# Patient Record
Sex: FEMALE | Race: WHITE | NOT HISPANIC OR LATINO
[De-identification: names, ages, dates, MRNs, and addresses within clinical notes are randomized per-mention and may not be internally consistent; named-entity substitution may affect disease eponyms.]

---

## 2019-07-31 ENCOUNTER — APPOINTMENT (OUTPATIENT)
Dept: OTOLARYNGOLOGY | Facility: CLINIC | Age: 84
End: 2019-07-31
Payer: MEDICARE

## 2019-07-31 VITALS
DIASTOLIC BLOOD PRESSURE: 75 MMHG | OXYGEN SATURATION: 99 % | TEMPERATURE: 98.5 F | RESPIRATION RATE: 15 BRPM | WEIGHT: 121 LBS | HEART RATE: 65 BPM | SYSTOLIC BLOOD PRESSURE: 168 MMHG | HEIGHT: 60 IN | BODY MASS INDEX: 23.75 KG/M2

## 2019-07-31 DIAGNOSIS — H90.41 SENSORINEURAL HEARING LOSS, UNILATERAL, RIGHT EAR, WITH UNRESTRICTED HEARING ON THE CONTRALATERAL SIDE: ICD-10-CM

## 2019-07-31 PROCEDURE — 92550 TYMPANOMETRY & REFLEX THRESH: CPT

## 2019-07-31 PROCEDURE — 99203 OFFICE O/P NEW LOW 30 MIN: CPT

## 2019-07-31 PROCEDURE — 92557 COMPREHENSIVE HEARING TEST: CPT

## 2019-07-31 NOTE — HISTORY OF PRESENT ILLNESS
[de-identified] : 86 yo woman with h/o b snhl and r cwd mastoidectomy has noticed r progressive hl for months - years.no inciting event. no pain or drainage, no uri. denies vertigo. no fh cw cc. she feels hl is moderate and wisehs to see what can be done. has had cwd mastoidectomy many yrs ago.

## 2019-07-31 NOTE — PHYSICAL EXAM
[de-identified] : r cavity clean canal stenotic [Midline] : trachea located in midline position [Normal] : no rashes

## 2022-03-03 ENCOUNTER — NON-APPOINTMENT (OUTPATIENT)
Age: 87
End: 2022-03-03

## 2022-03-03 ENCOUNTER — APPOINTMENT (OUTPATIENT)
Dept: OPHTHALMOLOGY | Facility: CLINIC | Age: 87
End: 2022-03-03
Payer: MEDICARE

## 2022-03-03 PROCEDURE — 92020 GONIOSCOPY: CPT

## 2022-03-03 PROCEDURE — 92004 COMPRE OPH EXAM NEW PT 1/>: CPT

## 2022-04-14 ENCOUNTER — APPOINTMENT (OUTPATIENT)
Dept: OPHTHALMOLOGY | Facility: CLINIC | Age: 87
End: 2022-04-14
Payer: MEDICARE

## 2022-04-14 ENCOUNTER — NON-APPOINTMENT (OUTPATIENT)
Age: 87
End: 2022-04-14

## 2022-04-14 PROCEDURE — 92012 INTRM OPH EXAM EST PATIENT: CPT

## 2022-08-18 ENCOUNTER — APPOINTMENT (OUTPATIENT)
Dept: OPHTHALMOLOGY | Facility: CLINIC | Age: 87
End: 2022-08-18

## 2022-08-18 ENCOUNTER — NON-APPOINTMENT (OUTPATIENT)
Age: 87
End: 2022-08-18

## 2022-08-18 PROCEDURE — 92012 INTRM OPH EXAM EST PATIENT: CPT

## 2022-09-22 ENCOUNTER — APPOINTMENT (OUTPATIENT)
Dept: OTOLARYNGOLOGY | Facility: CLINIC | Age: 87
End: 2022-09-22

## 2022-12-02 ENCOUNTER — NON-APPOINTMENT (OUTPATIENT)
Age: 87
End: 2022-12-02

## 2022-12-02 ENCOUNTER — APPOINTMENT (OUTPATIENT)
Dept: OPHTHALMOLOGY | Facility: CLINIC | Age: 87
End: 2022-12-02

## 2022-12-02 PROCEDURE — 92012 INTRM OPH EXAM EST PATIENT: CPT

## 2023-04-14 ENCOUNTER — NON-APPOINTMENT (OUTPATIENT)
Age: 88
End: 2023-04-14

## 2023-04-14 ENCOUNTER — APPOINTMENT (OUTPATIENT)
Dept: OPHTHALMOLOGY | Facility: CLINIC | Age: 88
End: 2023-04-14
Payer: MEDICARE

## 2023-04-14 PROCEDURE — 92012 INTRM OPH EXAM EST PATIENT: CPT

## 2023-08-11 ENCOUNTER — APPOINTMENT (OUTPATIENT)
Dept: OPHTHALMOLOGY | Facility: CLINIC | Age: 88
End: 2023-08-11
Payer: MEDICARE

## 2023-08-11 ENCOUNTER — NON-APPOINTMENT (OUTPATIENT)
Age: 88
End: 2023-08-11

## 2023-08-11 PROCEDURE — 92012 INTRM OPH EXAM EST PATIENT: CPT

## 2024-01-26 ENCOUNTER — NON-APPOINTMENT (OUTPATIENT)
Age: 89
End: 2024-01-26

## 2024-01-26 ENCOUNTER — APPOINTMENT (OUTPATIENT)
Dept: OPHTHALMOLOGY | Facility: CLINIC | Age: 89
End: 2024-01-26
Payer: MEDICARE

## 2024-01-26 PROCEDURE — 92083 EXTENDED VISUAL FIELD XM: CPT

## 2024-01-26 PROCEDURE — 92133 CPTRZD OPH DX IMG PST SGM ON: CPT

## 2024-01-26 PROCEDURE — 92012 INTRM OPH EXAM EST PATIENT: CPT

## 2024-04-19 ENCOUNTER — NON-APPOINTMENT (OUTPATIENT)
Age: 89
End: 2024-04-19

## 2024-04-19 ENCOUNTER — APPOINTMENT (OUTPATIENT)
Dept: OPHTHALMOLOGY | Facility: CLINIC | Age: 89
End: 2024-04-19
Payer: MEDICARE

## 2024-04-19 PROCEDURE — 92012 INTRM OPH EXAM EST PATIENT: CPT

## 2024-08-15 ENCOUNTER — APPOINTMENT (OUTPATIENT)
Dept: OPHTHALMOLOGY | Facility: CLINIC | Age: 89
End: 2024-08-15
Payer: MEDICARE

## 2024-08-15 ENCOUNTER — NON-APPOINTMENT (OUTPATIENT)
Age: 89
End: 2024-08-15

## 2024-08-15 PROCEDURE — 92012 INTRM OPH EXAM EST PATIENT: CPT

## 2025-02-13 ENCOUNTER — NON-APPOINTMENT (OUTPATIENT)
Age: 89
End: 2025-02-13

## 2025-02-13 ENCOUNTER — APPOINTMENT (OUTPATIENT)
Dept: OPHTHALMOLOGY | Facility: CLINIC | Age: 89
End: 2025-02-13
Payer: MEDICARE

## 2025-02-13 PROCEDURE — 92012 INTRM OPH EXAM EST PATIENT: CPT

## 2025-05-08 ENCOUNTER — NON-APPOINTMENT (OUTPATIENT)
Age: 89
End: 2025-05-08

## 2025-05-08 ENCOUNTER — APPOINTMENT (OUTPATIENT)
Dept: OPHTHALMOLOGY | Facility: CLINIC | Age: 89
End: 2025-05-08
Payer: MEDICARE

## 2025-05-08 PROCEDURE — 92014 COMPRE OPH EXAM EST PT 1/>: CPT

## 2025-05-08 PROCEDURE — 92133 CPTRZD OPH DX IMG PST SGM ON: CPT

## 2025-05-08 PROCEDURE — 92136 OPHTHALMIC BIOMETRY: CPT

## 2025-06-09 ENCOUNTER — APPOINTMENT (OUTPATIENT)
Dept: OPHTHALMOLOGY | Facility: CLINIC | Age: 89
End: 2025-06-09
Payer: MEDICARE

## 2025-06-09 ENCOUNTER — NON-APPOINTMENT (OUTPATIENT)
Age: 89
End: 2025-06-09

## 2025-06-09 PROCEDURE — 92250 FUNDUS PHOTOGRAPHY W/I&R: CPT

## 2025-06-09 PROCEDURE — 92014 COMPRE OPH EXAM EST PT 1/>: CPT

## 2025-07-01 RX ORDER — ATROPINE SULFATE 1 %
1 OINTMENT (GRAM) OPHTHALMIC (EYE)
Refills: 0 | Status: COMPLETED | OUTPATIENT
Start: 2025-07-02 | End: 2025-07-02

## 2025-07-01 RX ORDER — OFLOXACIN 3 MG/ML
1 SOLUTION OPHTHALMIC
Refills: 0 | Status: COMPLETED | OUTPATIENT
Start: 2025-07-02 | End: 2025-07-02

## 2025-07-01 RX ORDER — PHENYLEPHRINE HYDROCHLORIDE 25 MG/ML
1 SOLUTION OPHTHALMIC
Refills: 0 | Status: COMPLETED | OUTPATIENT
Start: 2025-07-02 | End: 2025-07-02

## 2025-07-01 RX ORDER — TROPICAMIDE
1 POWDER (GRAM) MISCELLANEOUS
Refills: 0 | Status: COMPLETED | OUTPATIENT
Start: 2025-07-02 | End: 2025-07-02

## 2025-07-01 NOTE — ASU PATIENT PROFILE, ADULT - NS PREOP UNDERSTANDS INFO
NPO/NO  solid foods after  12Mn,  water till  6 am, dress comfortable, no lotions, no jewelry,  Bring ID photo  and insurance cards,  escort arranged with daughter , address and telephone given/yes

## 2025-07-01 NOTE — ASU PATIENT PROFILE, ADULT - FALL HARM RISK - HARM RISK INTERVENTIONS
Communicate Risk of Fall with Harm to all staff/Reinforce activity limits and safety measures with patient and family/Tailored Fall Risk Interventions/Visual Cue: Yellow wristband and red socks/Bed in lowest position, wheels locked, appropriate side rails in place/Call bell, personal items and telephone in reach/Instruct patient to call for assistance before getting out of bed or chair/Non-slip footwear when patient is out of bed/[place] to call system/Physically safe environment - no spills, clutter or unnecessary equipment/Purposeful Proactive Rounding/Room/bathroom lighting operational, light cord in reach Assistance with ambulation/Assistance OOB with selected safe patient handling equipment/Communicate Risk of Fall with Harm to all staff/Discuss with provider need for PT consult/Monitor gait and stability/Provide patient with walking aids - walker, cane, crutches/Reinforce activity limits and safety measures with patient and family/Tailored Fall Risk Interventions/Visual Cue: Yellow wristband and red socks/Bed in lowest position, wheels locked, appropriate side rails in place/Call bell, personal items and telephone in reach/Instruct patient to call for assistance before getting out of bed or chair/Non-slip footwear when patient is out of bed/[place] to call system/Physically safe environment - no spills, clutter or unnecessary equipment/Purposeful Proactive Rounding/Room/bathroom lighting operational, light cord in reach

## 2025-07-01 NOTE — ASU PATIENT PROFILE, ADULT - MEDICATIONS TO HOLD
Losartan and Hydrochlorothiazide   as per MD  guide lines  asked to call  her MD for advise , patient concerns about this holding

## 2025-07-01 NOTE — ASU PATIENT PROFILE, ADULT - PRESSURE ULCER(S)
Pre-Surgery Instructions:   Medication Instructions    aspirin (ECOTRIN LOW STRENGTH) 81 mg EC tablet Patient was instructed by Physician and understands   atorvastatin (LIPITOR) 80 mg tablet Continue to take this medication on your normal schedule  If this is an oral medication and you take it in the morning, then you may take this medicine with a sip of water  Spoke with patient's daughter, Colten Walters, after obtaining permission from patient  Covid screening negative as per patient's daughter  Reviewed via phone all medication instructions  Advised not to take any NSAID's, Vitamins or Herbal products prior to the DOS  Acetaminophen products are ok to take  Patient's daughter states pt was instructed by cardiologist, Belem Mccracken, to continue taking Aspirin as ordered,do not hold prior to surgery and instructed to call Dr  with any questions  Reviewed showering instructions as given by Surgeon's office  Informed about call from [facility] with time of scheduled surgery  Verbal understanding obtained  ASA Med Class: Aspirin  Should be discontinued at least one week prior to planned operation, unless specifically stated otherwise by surgical service  Your Surgeon may have patient stop taking aspirin up to a week before surgery if having intracranial, middle ear, posterior eye, spine surgery or prostate surgery  [Patients taking aspirin for coronary stents should be reviewed by an anesthesiologist in the optimization clinic  Please do not discontinue aspirin in patients with coronary stents unless given specific permission to do so by the cardiologist who prescribed medication ]   If your surgeon approves please continue to take this medication on your normal schedule  You may take this medication on the morning of your surgery with a sip of water  Statin Med Class  Continue to take this medication on your normal schedule    If this is an oral medication and you take it in the morning, then you may take this medicine with a sip of water  no

## 2025-07-01 NOTE — ASU PATIENT PROFILE, ADULT - NSICDXPASTMEDICALHX_GEN_ALL_CORE_FT
PAST MEDICAL HISTORY:  H/O Crohn's disease     H/O: hypertension      PAST MEDICAL HISTORY:  H/O Crohn's disease     H/O: hypertension     TIA on medication aspirin 325

## 2025-07-01 NOTE — ASU PATIENT PROFILE, ADULT - NSICDXPASTSURGICALHX_GEN_ALL_CORE_FT
PAST SURGICAL HISTORY:  History of Crohn's disease intestinal surgery ayears ago    History of disorder of ear or mastoid right

## 2025-07-02 ENCOUNTER — RESULT REVIEW (OUTPATIENT)
Age: 89
End: 2025-07-02

## 2025-07-02 ENCOUNTER — OUTPATIENT (OUTPATIENT)
Dept: OUTPATIENT SERVICES | Facility: HOSPITAL | Age: 89
LOS: 1 days | Discharge: HOME | End: 2025-07-02
Payer: MEDICARE

## 2025-07-02 ENCOUNTER — NON-APPOINTMENT (OUTPATIENT)
Age: 89
End: 2025-07-02

## 2025-07-02 ENCOUNTER — APPOINTMENT (OUTPATIENT)
Dept: OPHTHALMOLOGY | Facility: AMBULATORY SURGERY CENTER | Age: 89
End: 2025-07-02

## 2025-07-02 VITALS
DIASTOLIC BLOOD PRESSURE: 64 MMHG | RESPIRATION RATE: 16 BRPM | TEMPERATURE: 98 F | SYSTOLIC BLOOD PRESSURE: 157 MMHG | HEIGHT: 60 IN | HEART RATE: 76 BPM | WEIGHT: 119.49 LBS | OXYGEN SATURATION: 97 %

## 2025-07-02 VITALS
RESPIRATION RATE: 16 BRPM | OXYGEN SATURATION: 97 % | HEART RATE: 72 BPM | DIASTOLIC BLOOD PRESSURE: 69 MMHG | TEMPERATURE: 98 F | SYSTOLIC BLOOD PRESSURE: 155 MMHG

## 2025-07-02 DIAGNOSIS — Z87.19 PERSONAL HISTORY OF OTHER DISEASES OF THE DIGESTIVE SYSTEM: Chronic | ICD-10-CM

## 2025-07-02 DIAGNOSIS — Z86.69 PERSONAL HISTORY OF OTHER DISEASES OF THE NERVOUS SYSTEM AND SENSE ORGANS: Chronic | ICD-10-CM

## 2025-07-02 PROCEDURE — 66986 EXCHANGE LENS PROSTHESIS: CPT | Mod: LT

## 2025-07-02 PROCEDURE — 88300 SURGICAL PATH GROSS: CPT | Mod: 26

## 2025-07-02 PROCEDURE — 67036 REMOVAL OF INNER EYE FLUID: CPT | Mod: LT

## 2025-07-02 DEVICE — LASER PROBE 25G CONSTELLATION: Type: IMPLANTABLE DEVICE | Site: LEFT EYE | Status: FUNCTIONAL

## 2025-07-02 DEVICE — IMPLANTABLE DEVICE: Type: IMPLANTABLE DEVICE | Site: LEFT EYE | Status: FUNCTIONAL

## 2025-07-02 RX ORDER — ASPIRIN 325 MG
0 TABLET ORAL
Refills: 0 | DISCHARGE

## 2025-07-02 RX ORDER — ASPIRIN 325 MG
1 TABLET ORAL
Refills: 0 | DISCHARGE

## 2025-07-02 RX ORDER — TETRACAINE HYDROCHLORIDE 5 MG/ML
1 SOLUTION OPHTHALMIC ONCE
Refills: 0 | Status: COMPLETED | OUTPATIENT
Start: 2025-07-02 | End: 2025-07-02

## 2025-07-02 RX ORDER — AMLODIPINE BESYLATE 10 MG/1
1 TABLET ORAL
Refills: 0 | DISCHARGE

## 2025-07-02 RX ORDER — SODIUM CHLORIDE 9 G/1000ML
500 INJECTION, SOLUTION INTRAVENOUS
Refills: 0 | Status: DISCONTINUED | OUTPATIENT
Start: 2025-07-02 | End: 2025-07-02

## 2025-07-02 RX ORDER — ATORVASTATIN CALCIUM 80 MG/1
1 TABLET, FILM COATED ORAL
Refills: 0 | DISCHARGE

## 2025-07-02 RX ORDER — LOSARTAN POTASSIUM 100 MG/1
1 TABLET, FILM COATED ORAL
Refills: 0 | DISCHARGE

## 2025-07-02 RX ORDER — ONDANSETRON HCL/PF 4 MG/2 ML
4 VIAL (ML) INJECTION ONCE
Refills: 0 | Status: DISCONTINUED | OUTPATIENT
Start: 2025-07-02 | End: 2025-07-02

## 2025-07-02 RX ORDER — HYDROCHLOROTHIAZIDE 50 MG/1
1 TABLET ORAL
Refills: 0 | DISCHARGE

## 2025-07-02 RX ORDER — LOSARTAN POTASSIUM AND HYDROCHLOROTHIAZIDE 12.5; 5 MG/1; MG/1
1 TABLET ORAL
Refills: 0 | DISCHARGE

## 2025-07-02 RX ADMIN — Medication 1 DROP(S): at 10:45

## 2025-07-02 RX ADMIN — OFLOXACIN 1 DROP(S): 3 SOLUTION OPHTHALMIC at 10:45

## 2025-07-02 RX ADMIN — PHENYLEPHRINE HYDROCHLORIDE 1 DROP(S): 25 SOLUTION OPHTHALMIC at 10:50

## 2025-07-02 RX ADMIN — TETRACAINE HYDROCHLORIDE 1 DROP(S): 5 SOLUTION OPHTHALMIC at 10:40

## 2025-07-02 RX ADMIN — OFLOXACIN 1 DROP(S): 3 SOLUTION OPHTHALMIC at 10:50

## 2025-07-02 RX ADMIN — PHENYLEPHRINE HYDROCHLORIDE 1 DROP(S): 25 SOLUTION OPHTHALMIC at 10:40

## 2025-07-02 RX ADMIN — Medication 1 DROP(S): at 10:50

## 2025-07-02 RX ADMIN — Medication 1 DROP(S): at 10:40

## 2025-07-02 RX ADMIN — PHENYLEPHRINE HYDROCHLORIDE 1 DROP(S): 25 SOLUTION OPHTHALMIC at 10:45

## 2025-07-02 RX ADMIN — OFLOXACIN 1 DROP(S): 3 SOLUTION OPHTHALMIC at 10:40

## 2025-07-02 NOTE — PRE-ANESTHESIA EVALUATION ADULT - NSANTHOSAYNRD_GEN_A_CORE
No. JASSON screening performed.  STOP BANG Legend: 0-2 = LOW Risk; 3-4 = INTERMEDIATE Risk; 5-8 = HIGH Risk

## 2025-07-02 NOTE — OPERATIVE REPORT - OPERATIVE RPOSRT DETAILS
Dislocated IOL and IOL exchange       PATIENT NAME: CLAIR SHER    ATTENDING: Christine Ha MD    PREOPERATIVE DIAGNOSIS(ES):  Dislocated intraocular lens, left eye    POSTOPERATIVE DIAGNOSIS(ES):  As above    PROCEDURE: Pars plana vitrectomy, IOL exchange with scleral fixated IOL -  all of the left eye     INDICATIONS:       The patient is a 89y year old Female with a history of a dislocated intraocular lens in the operative eye. After a detailed review of the risks, benefits and alternatives of the procedure, including but not limited to bleeding, infection, inflammation, retinal detachment, loss of vision, loss of eye, double vision, unclear visual potential, need for further surgery, and systemic risks of anesthesia and surgery, informed consent was obtained and the patient elected to proceed with the surgery.     PROCEDURE:       On the day of surgery, after clearance by medicine and anesthesia, the patient was brought to the operating room in stable condition. After verifying the correct surgical site, the patient was placed in the supine position. Intravenous sedation was provided by the anesthesia team.  After a brief time-out, a 1:1 mixture of 2% lidocaine and 0.75% Marcaine was injected in a retrobulbar fashion behind the operative eye. The patient was then prepped and draped in the usual sterile fashion.  Eyelashes were draped out of the operative field and a stainless steel eyelid speculum was placed in the operative eye.  A time-out was performed verifying correct patient, procedure, site, positioning and special equipment prior to starting the case.      A 1.0 sideport was used to make 4 paracenteses for the placement of 4 iris hooks to allow visualization of the IOL and assessment of the possibility of an IOL repositioning versus exchange. When the haptics were visualized and it was apparent the lens had 4 haptics and no apertures in the haptics, it was clear that a prolene refixation was not possible and the decision was made to proceed with an IOL exchange.    A toric marker was used to monica the horizontal meridian at 3 and 9 o’clock on the cornea. A caliper was then used to monica 3 mm posterior to the limbus and 5 mm apart at these locations with a surgical marking pen. A limbal peritomy was made temporally and nasally and external cautery was used for hemostasis. Care was taken to avoid prior sclerotomies and areas of scleral thinning.    A 25-gauge microcannula was placed in the inferotemporal quadrant in a beveled fashion 3.5 mm posterior to the limbus. The infusion cannula was cleared of air, inserted into the microcannula, confirmed to be in the correct position within the vitreous cavity by direct visualization through the dilated pupil with the light pipe and then turned on under direct visualization. Two additional microcannulas were placed superonasally and superotemporally in a similar fashion (parallel to limbus) at the previously marked locations.     Then, under indirect wide field visualization, a core and peripheral vitrectomy was performed. The posterior hyaloid was confirmed to be   using active aspiration and Kenalog assistance.  The vitreous was excised 360 degrees to the posterior vitreous base and no retinal breaks were noted with scleral depression.    The infusion was closed and Healon was placed into the anterior chamber. The intraocular lens implant was elevated into the anterior chamber.  A 3.2 mm keratome was then used to make a biplanar clear corneal incision of 3.6 mm and the intraocular lens was rotated out of the eye and send to pathology as a surgical specimen.    The remained capsular bag was removed. Then, under indirect wide field visualization, 360 degrees scleral depression was again performed. There were no visible tears, breaks or holes.    The infusion was closed and Healon was placed into the anterior chamber. A 3.2 mm keratome was then used to make a biplanar clear corneal incision of 3.6 mm and the intraocular lens was cut in a pac-man fashion and then rotated out of the eye and send to pathology as a surgical specimen.    The trocars were then used to make the inferior sclerotomies. A CV8 Goretex suture was cut into thirds and threaded into the eyelets of the IOL in an "U" fashion. The lens was an Akreos +22.0D, Serial number [id number] Expiration date 4-30-27. The sutures were then carefully fed into the eye in a hand over fist fashion and then externalized through the sclerotomies. The sutures were adjusted until the lens was well centered and then locked. The knots were then rotated into the eye. The superior sclerotomies were then sutured with an 8-0 vicryl and were watertight.     A 10-nylon was then used to close the main wound in an x-stitch fashion. The edges of the suture were trimmed and the knot was buried.     The overlying conjunctiva was reapproximated and the inferior microcannula and associated infusion line was removed from the   eye and sutured closed with an 8-0 vicryl. Pre-filtered miochol was placed into the eye and the iris was noted to be round. The corneal wounds were checked and confirmed to be watertight.      All sclerotomies remained watertight and the eye remained at a physiologic pressure by palpation.     The eyelid speculum was removed from the eye. Betadine was cleaned from around the eye. A topical steroid/antibiotic cream was placed on the eye, followed by a patch and a clear plastic shield. The patient tolerated the procedure well and left the operating room in stable condition.

## 2025-07-03 ENCOUNTER — APPOINTMENT (OUTPATIENT)
Dept: OPHTHALMOLOGY | Facility: CLINIC | Age: 89
End: 2025-07-03
Payer: MEDICARE

## 2025-07-03 ENCOUNTER — NON-APPOINTMENT (OUTPATIENT)
Age: 89
End: 2025-07-03

## 2025-07-03 PROCEDURE — 99024 POSTOP FOLLOW-UP VISIT: CPT

## 2025-07-11 ENCOUNTER — APPOINTMENT (OUTPATIENT)
Dept: OPHTHALMOLOGY | Facility: CLINIC | Age: 89
End: 2025-07-11
Payer: MEDICARE

## 2025-07-11 ENCOUNTER — NON-APPOINTMENT (OUTPATIENT)
Age: 89
End: 2025-07-11

## 2025-07-11 PROBLEM — G45.9 TRANSIENT CEREBRAL ISCHEMIC ATTACK, UNSPECIFIED: Chronic | Status: ACTIVE | Noted: 2025-07-02

## 2025-07-11 PROCEDURE — 99024 POSTOP FOLLOW-UP VISIT: CPT

## 2025-08-04 ENCOUNTER — NON-APPOINTMENT (OUTPATIENT)
Age: 89
End: 2025-08-04

## 2025-08-04 ENCOUNTER — APPOINTMENT (OUTPATIENT)
Dept: OPHTHALMOLOGY | Facility: CLINIC | Age: 89
End: 2025-08-04
Payer: MEDICARE

## 2025-08-04 PROCEDURE — 92134 CPTRZ OPH DX IMG PST SGM RTA: CPT

## 2025-08-04 PROCEDURE — 99024 POSTOP FOLLOW-UP VISIT: CPT

## 2025-08-26 ENCOUNTER — APPOINTMENT (OUTPATIENT)
Dept: OPHTHALMOLOGY | Facility: CLINIC | Age: 89
End: 2025-08-26

## 2025-08-26 ENCOUNTER — NON-APPOINTMENT (OUTPATIENT)
Age: 89
End: 2025-08-26

## 2025-08-26 PROCEDURE — 92083 EXTENDED VISUAL FIELD XM: CPT

## 2025-08-26 PROCEDURE — 92202 OPSCPY EXTND ON/MAC DRAW: CPT

## 2025-08-26 PROCEDURE — 92134 CPTRZ OPH DX IMG PST SGM RTA: CPT

## 2025-08-26 PROCEDURE — 92014 COMPRE OPH EXAM EST PT 1/>: CPT | Mod: 24

## 2025-08-26 PROCEDURE — 92060 SENSORIMOTOR EXAMINATION: CPT

## 2025-09-08 ENCOUNTER — NON-APPOINTMENT (OUTPATIENT)
Age: 89
End: 2025-09-08

## 2025-09-08 ENCOUNTER — APPOINTMENT (OUTPATIENT)
Dept: OPHTHALMOLOGY | Facility: CLINIC | Age: 89
End: 2025-09-08
Payer: MEDICARE

## 2025-09-08 PROCEDURE — 99024 POSTOP FOLLOW-UP VISIT: CPT

## 2025-09-08 PROCEDURE — 92020 GONIOSCOPY: CPT

## (undated) DEVICE — PACK CENTURION 2.4MM

## (undated) DEVICE — APPLICATOR COTTON TIP 3" STERILE

## (undated) DEVICE — KNIFE ALCON I-KNIFE II STAB KNIFE STANDARD 3MM (GREEN)

## (undated) DEVICE — PICK MICRO .5MM 25G

## (undated) DEVICE — ILM FORCEP 25G

## (undated) DEVICE — SPEAR SURG EYE WECK-CELL CELOS

## (undated) DEVICE — PACK VITRECTOMY  LF

## (undated) DEVICE — ILM FORCEP 25G PLUS

## (undated) DEVICE — VENODYNE/SCD SLEEVE CALF MEDIUM

## (undated) DEVICE — Device

## (undated) DEVICE — LENS IOL MIRROR LENS DISP

## (undated) DEVICE — BACKFLUSH SOFT TIP 25G

## (undated) DEVICE — ELCTR BIPOLAR CORD 12FT

## (undated) DEVICE — SOL IRR SALT BSS PLUS 500ML

## (undated) DEVICE — LENS VOLK1 FLAT 1X STRL

## (undated) DEVICE — KNIFE ALCON CRESCENT ANGLED BEVEL UP 2.3MM (PINK)

## (undated) DEVICE — TIP SUCTION I/A STR 1USE POLYMER 0.3MM

## (undated) DEVICE — ELCTR ERASER BI-P BVL 45DEG 18G

## (undated) DEVICE — SUT VICRYL 8-0 12" TG140-8 DA

## (undated) DEVICE — RETRACTOR SYNERGETICS IRIS FLEXIBLE DISP

## (undated) DEVICE — FORCEP GRIESHABER MAXGRIP 25GA DISP

## (undated) DEVICE — CANNULA SURGICAL SPECIALTIES DUAL BORE 25G

## (undated) DEVICE — CANNULA MEDONE DUAL BORE SIDEFLO 25G

## (undated) DEVICE — DRAPE MICROSCOPE KNOB COVER SMALL (2 PCS)

## (undated) DEVICE — MILLEX HA 45UM

## (undated) DEVICE — KNIFE ALCON SLIT INTREPID CLEAR-CUT SAFETY 2.4MM

## (undated) DEVICE — SUT SILK 7-0 18" TG140-8

## (undated) DEVICE — GONIO LENS IPRISM S 1.1X LEFT HAND

## (undated) DEVICE — KNIFE ALCON MVR V-LANCE 20G (WHITE)

## (undated) DEVICE — WARMING BLANKET LOWER ADULT

## (undated) DEVICE — NUCLEUS HYDRODISSECTOR PEARCE ANGLED 25G X 22MM

## (undated) DEVICE — GLV 7.5 PROTEXIS (WHITE)

## (undated) DEVICE — SYR FILTER 22 MICRONS

## (undated) DEVICE — PACK ANTERIOR SEGMENT